# Patient Record
Sex: MALE | Employment: FULL TIME | URBAN - NONMETROPOLITAN AREA
[De-identification: names, ages, dates, MRNs, and addresses within clinical notes are randomized per-mention and may not be internally consistent; named-entity substitution may affect disease eponyms.]

---

## 2024-08-17 ENCOUNTER — HOSPITAL ENCOUNTER (INPATIENT)
Age: 49
LOS: 4 days | Discharge: HOME | DRG: 881 | End: 2024-08-21
Attending: PSYCHIATRY & NEUROLOGY | Admitting: PSYCHIATRY & NEUROLOGY
Payer: MEDICAID

## 2024-08-17 PROBLEM — F32.9 MAJOR DEPRESSIVE EPISODE: Status: ACTIVE | Noted: 2024-08-17

## 2024-08-17 PROCEDURE — 1240000000 HC EMOTIONAL WELLNESS R&B

## 2024-08-17 PROCEDURE — 6370000000 HC RX 637 (ALT 250 FOR IP): Performed by: PSYCHIATRY & NEUROLOGY

## 2024-08-17 RX ORDER — LORAZEPAM 2 MG/ML
2 INJECTION INTRAMUSCULAR EVERY 4 HOURS PRN
Status: DISCONTINUED | OUTPATIENT
Start: 2024-08-17 | End: 2024-08-21 | Stop reason: HOSPADM

## 2024-08-17 RX ORDER — MAGNESIUM HYDROXIDE/ALUMINUM HYDROXICE/SIMETHICONE 120; 1200; 1200 MG/30ML; MG/30ML; MG/30ML
30 SUSPENSION ORAL EVERY 6 HOURS PRN
Status: DISCONTINUED | OUTPATIENT
Start: 2024-08-17 | End: 2024-08-21 | Stop reason: HOSPADM

## 2024-08-17 RX ORDER — NICOTINE 21 MG/24HR
1 PATCH, TRANSDERMAL 24 HOURS TRANSDERMAL DAILY
Status: DISCONTINUED | OUTPATIENT
Start: 2024-08-17 | End: 2024-08-18

## 2024-08-17 RX ORDER — HALOPERIDOL 5 MG/ML
5 INJECTION INTRAMUSCULAR EVERY 4 HOURS PRN
Status: DISCONTINUED | OUTPATIENT
Start: 2024-08-17 | End: 2024-08-21 | Stop reason: HOSPADM

## 2024-08-17 RX ORDER — HALOPERIDOL 5 MG/1
5 TABLET ORAL EVERY 4 HOURS PRN
Status: DISCONTINUED | OUTPATIENT
Start: 2024-08-17 | End: 2024-08-21 | Stop reason: HOSPADM

## 2024-08-17 RX ORDER — IBUPROFEN 400 MG/1
400 TABLET ORAL EVERY 6 HOURS PRN
Status: DISCONTINUED | OUTPATIENT
Start: 2024-08-17 | End: 2024-08-21 | Stop reason: HOSPADM

## 2024-08-17 RX ORDER — POLYETHYLENE GLYCOL 3350 17 G/17G
17 POWDER, FOR SOLUTION ORAL DAILY PRN
Status: DISCONTINUED | OUTPATIENT
Start: 2024-08-17 | End: 2024-08-21 | Stop reason: HOSPADM

## 2024-08-17 RX ORDER — ACETAMINOPHEN 325 MG/1
650 TABLET ORAL EVERY 4 HOURS PRN
Status: DISCONTINUED | OUTPATIENT
Start: 2024-08-17 | End: 2024-08-21 | Stop reason: HOSPADM

## 2024-08-17 RX ORDER — TRAZODONE HYDROCHLORIDE 50 MG/1
50 TABLET ORAL NIGHTLY PRN
Status: DISCONTINUED | OUTPATIENT
Start: 2024-08-17 | End: 2024-08-18

## 2024-08-17 RX ORDER — HYDROXYZINE HYDROCHLORIDE 25 MG/1
50 TABLET, FILM COATED ORAL 3 TIMES DAILY PRN
Status: DISCONTINUED | OUTPATIENT
Start: 2024-08-17 | End: 2024-08-21 | Stop reason: HOSPADM

## 2024-08-17 RX ORDER — LORAZEPAM 2 MG/1
2 TABLET ORAL EVERY 4 HOURS PRN
Status: DISCONTINUED | OUTPATIENT
Start: 2024-08-17 | End: 2024-08-21 | Stop reason: HOSPADM

## 2024-08-17 RX ORDER — TRAZODONE HYDROCHLORIDE 50 MG/1
50 TABLET ORAL ONCE
Status: COMPLETED | OUTPATIENT
Start: 2024-08-18 | End: 2024-08-17

## 2024-08-17 RX ADMIN — TRAZODONE HYDROCHLORIDE 50 MG: 50 TABLET ORAL at 22:20

## 2024-08-17 RX ADMIN — TRAZODONE HYDROCHLORIDE 50 MG: 50 TABLET ORAL at 23:53

## 2024-08-17 RX ADMIN — HYDROXYZINE HYDROCHLORIDE 50 MG: 25 TABLET ORAL at 22:20

## 2024-08-17 ASSESSMENT — PATIENT HEALTH QUESTIONNAIRE - PHQ9
1. LITTLE INTEREST OR PLEASURE IN DOING THINGS: NOT AT ALL
SUM OF ALL RESPONSES TO PHQ QUESTIONS 1-9: 1
SUM OF ALL RESPONSES TO PHQ9 QUESTIONS 1 & 2: 1
2. FEELING DOWN, DEPRESSED OR HOPELESS: SEVERAL DAYS
SUM OF ALL RESPONSES TO PHQ QUESTIONS 1-9: 1

## 2024-08-17 ASSESSMENT — LIFESTYLE VARIABLES
HOW MANY STANDARD DRINKS CONTAINING ALCOHOL DO YOU HAVE ON A TYPICAL DAY: PATIENT DOES NOT DRINK
HOW OFTEN DO YOU HAVE A DRINK CONTAINING ALCOHOL: NEVER

## 2024-08-17 ASSESSMENT — SLEEP AND FATIGUE QUESTIONNAIRES
SLEEP PATTERN: DIFFICULTY FALLING ASLEEP;INSOMNIA;DISTURBED/INTERRUPTED SLEEP
DO YOU USE A SLEEP AID: NO
AVERAGE NUMBER OF SLEEP HOURS: 2
DO YOU HAVE DIFFICULTY SLEEPING: YES

## 2024-08-18 PROBLEM — R45.851 DEPRESSION WITH SUICIDAL IDEATION: Status: ACTIVE | Noted: 2024-08-18

## 2024-08-18 PROBLEM — F32.A DEPRESSION WITH SUICIDAL IDEATION: Status: ACTIVE | Noted: 2024-08-18

## 2024-08-18 PROCEDURE — APPSS60 APP SPLIT SHARED TIME 46-60 MINUTES: Performed by: NURSE PRACTITIONER

## 2024-08-18 PROCEDURE — 1240000000 HC EMOTIONAL WELLNESS R&B

## 2024-08-18 PROCEDURE — 6370000000 HC RX 637 (ALT 250 FOR IP): Performed by: PSYCHIATRY & NEUROLOGY

## 2024-08-18 PROCEDURE — 93005 ELECTROCARDIOGRAM TRACING: CPT | Performed by: NURSE PRACTITIONER

## 2024-08-18 PROCEDURE — 6370000000 HC RX 637 (ALT 250 FOR IP): Performed by: NURSE PRACTITIONER

## 2024-08-18 PROCEDURE — 99222 1ST HOSP IP/OBS MODERATE 55: CPT | Performed by: PSYCHIATRY & NEUROLOGY

## 2024-08-18 RX ORDER — TADALAFIL 10 MG/1
10 TABLET ORAL PRN
COMMUNITY

## 2024-08-18 RX ORDER — OMEPRAZOLE 20 MG/1
40 CAPSULE, DELAYED RELEASE ORAL DAILY
Status: ON HOLD | COMMUNITY
End: 2024-08-20

## 2024-08-18 RX ORDER — PANTOPRAZOLE SODIUM 40 MG/1
40 TABLET, DELAYED RELEASE ORAL
Status: DISCONTINUED | OUTPATIENT
Start: 2024-08-18 | End: 2024-08-21 | Stop reason: HOSPADM

## 2024-08-18 RX ORDER — TRAZODONE HYDROCHLORIDE 50 MG/1
100 TABLET ORAL NIGHTLY
Status: ON HOLD | COMMUNITY
End: 2024-08-20 | Stop reason: HOSPADM

## 2024-08-18 RX ORDER — ROSUVASTATIN CALCIUM 10 MG/1
10 TABLET, COATED ORAL DAILY
Status: ON HOLD | COMMUNITY
End: 2024-08-20

## 2024-08-18 RX ORDER — ROSUVASTATIN CALCIUM 10 MG/1
10 TABLET, COATED ORAL DAILY
Status: DISCONTINUED | OUTPATIENT
Start: 2024-08-18 | End: 2024-08-21 | Stop reason: HOSPADM

## 2024-08-18 RX ORDER — VENLAFAXINE HYDROCHLORIDE 37.5 MG/1
37.5 CAPSULE, EXTENDED RELEASE ORAL NIGHTLY
Status: DISCONTINUED | OUTPATIENT
Start: 2024-08-18 | End: 2024-08-19

## 2024-08-18 RX ORDER — TRAZODONE HYDROCHLORIDE 100 MG/1
100 TABLET ORAL NIGHTLY
Status: DISCONTINUED | OUTPATIENT
Start: 2024-08-18 | End: 2024-08-18

## 2024-08-18 RX ORDER — OMEGA-3-ACID ETHYL ESTERS 1 G/1
1 CAPSULE, LIQUID FILLED ORAL 2 TIMES DAILY
Status: ON HOLD | COMMUNITY
End: 2024-08-20 | Stop reason: HOSPADM

## 2024-08-18 RX ORDER — POLYETHYLENE GLYCOL 3350 17 G
2 POWDER IN PACKET (EA) ORAL
Status: DISCONTINUED | OUTPATIENT
Start: 2024-08-18 | End: 2024-08-21 | Stop reason: HOSPADM

## 2024-08-18 RX ORDER — OMEGA-3-ACID ETHYL ESTERS 1 G/1
1 CAPSULE, LIQUID FILLED ORAL 2 TIMES DAILY
Status: DISCONTINUED | OUTPATIENT
Start: 2024-08-18 | End: 2024-08-21 | Stop reason: HOSPADM

## 2024-08-18 RX ORDER — TRAZODONE HYDROCHLORIDE 100 MG/1
100 TABLET ORAL NIGHTLY PRN
Status: DISCONTINUED | OUTPATIENT
Start: 2024-08-18 | End: 2024-08-19

## 2024-08-18 RX ADMIN — HYDROXYZINE HYDROCHLORIDE 50 MG: 25 TABLET ORAL at 21:33

## 2024-08-18 RX ADMIN — NICOTINE POLACRILEX 2 MG: 2 LOZENGE ORAL at 15:20

## 2024-08-18 RX ADMIN — OMEGA-3-ACID ETHYL ESTERS CAPSULES 1 G: 1 CAPSULE, LIQUID FILLED ORAL at 21:34

## 2024-08-18 RX ADMIN — PANTOPRAZOLE SODIUM 40 MG: 40 TABLET, DELAYED RELEASE ORAL at 12:30

## 2024-08-18 RX ADMIN — VENLAFAXINE HYDROCHLORIDE 37.5 MG: 37.5 CAPSULE, EXTENDED RELEASE ORAL at 21:33

## 2024-08-18 RX ADMIN — NICOTINE POLACRILEX 2 MG: 2 LOZENGE ORAL at 17:34

## 2024-08-18 RX ADMIN — ROSUVASTATIN 10 MG: 10 TABLET, FILM COATED ORAL at 12:30

## 2024-08-18 RX ADMIN — NICOTINE POLACRILEX 2 MG: 2 LOZENGE ORAL at 20:10

## 2024-08-18 RX ADMIN — TRAZODONE HYDROCHLORIDE 100 MG: 100 TABLET ORAL at 21:34

## 2024-08-18 RX ADMIN — OMEGA-3-ACID ETHYL ESTERS CAPSULES 1 G: 1 CAPSULE, LIQUID FILLED ORAL at 12:30

## 2024-08-18 RX ADMIN — NICOTINE POLACRILEX 2 MG: 2 LOZENGE ORAL at 22:06

## 2024-08-18 NOTE — BH NOTE
Pt declined to attend 0900 Community Meeting and Goal group therapy session. Pt was provided maximum verbal encouragement to attend but pt remained resting in room. No daily goal was obtained.

## 2024-08-18 NOTE — H&P
report has been created using voice recognition software. It may contain minor errors which are inherent in voice recognition technology.**

## 2024-08-18 NOTE — GROUP NOTE
Group Therapy Note    Date: 8/18/2024    Group Start Time: 1400  Group End Time: 1445  Group Topic: Recreational    [facility] 7E    Cindy Tejeda CTRS        Group Therapy Note    Attendees: 5       Patient's Goal:  Pt to increase socialization with peers and knowledge of coping skills through [employer] game.    Notes:  Pt is engaging in group therapy conversation with peers and staff and verbalizes satisfaction with the staff here on the unit. Pt at times can be monopolizing of conversation but is easily redirected in conversation.     Status After Intervention:  Improved    Participation Level: Active Listener and Interactive    Participation Quality: Appropriate, Attentive, Sharing, and Supportive      Speech:  normal      Thought Process/Content: Logical      Affective Functioning: Congruent      Mood: euthymic      Level of consciousness:  Alert, Oriented x4, and Attentive      Response to Learning: Able to verbalize current knowledge/experience, Able to verbalize/acknowledge new learning, Able to retain information, Capable of insight, and Progressing to goal      Endings: None Reported    Modes of Intervention: Education, Support, Socialization, Exploration, Clarifying, Problem-solving, Activity, Limit-setting, and Reality-testing      Discipline Responsible: Psychoeducational Specialist      Signature:  KD Salazar

## 2024-08-18 NOTE — BH NOTE
INPATIENT RECREATIONAL THERAPY  ADULT BEHAVIORAL SERVICES  EVALUATION    REFERRING PHYSICIAN:  Dr. Mendoza   DIAGNOSIS:   Depression with Suicidal Ideations  PRECAUTIONS:  Standard Precautions   HISTORY OF PRESENT ILLNESS/INJURY: Pt is admitted to the unit under an EMC from [facility]. Pt reportedly was in a standoff with the plice for approximately three hours prior to arrival to the ED holding a gun to his head and threatening suicide. Pt reports he had conflict with his wife. He reports his wife left him two months ago and he has been feeling depressed since then. Pt reports he was at [facility] unit two months ago for depression and suicidal ideations. Pt reports he is unemployed but over the last several months has spent $70,000 traveling. He is irritable when questioned during assessments. Pt has been isolative to his room majority of this shift.   PMH:  Please see medical chart for prior medical history, allergies, and medication    HISTORY OF PSYCHIATRIC TREATMENT: Pt denies any outpatient psychiatric supports and denies any past suicide attempts. Pt reports a past admission to [facility] in [place] approximately 2 months ago.   YOB: 1975  GENDER:  Male   MARITAL STATUS:   but currently  from his wife  EMPLOYMENT STATUS:  Unemployed   LIVING SITUATION/SUPPORT:  Lives in [place] [facility] alone  EDUCATIONAL LEVEL:   MEDICATION/DRUG USE: Pt reports occasional alcohol use and occasional cannabis use. Pt did not identify frequency.     LEISURE INTERESTS:  Limit interests provided, outdoor activities, watching TV/movies, books  ACTIVITY PREFERENCE: Individual   ACTIVITY TYPES:  Indoor, Outdoor, Active, Passive  COGNITION: A&Ox4    COPING: Poor   ATTENTION: Fair   RELAXATION: Fair   SELF-ESTEEM: Poor   MOTIVATION:  Poor     SOCIAL SKILLS:  Fair-pt is isolative to his room at this time   FRUSTRATION TOLERANCE:  No documented hx of violence

## 2024-08-18 NOTE — PROCEDURES
PROCEDURE NOTE  Date: 8/18/2024   Name: Douglas Armstrong  YOB: 1975    Procedures  EKG completed    12

## 2024-08-19 PROBLEM — F32.9 MAJOR DEPRESSIVE EPISODE: Status: RESOLVED | Noted: 2024-08-17 | Resolved: 2024-08-19

## 2024-08-19 LAB
EKG ATRIAL RATE: 67 BPM
EKG P AXIS: 66 DEGREES
EKG P-R INTERVAL: 174 MS
EKG Q-T INTERVAL: 414 MS
EKG QRS DURATION: 92 MS
EKG QTC CALCULATION (BAZETT): 437 MS
EKG R AXIS: 46 DEGREES
EKG T AXIS: 28 DEGREES
EKG VENTRICULAR RATE: 67 BPM

## 2024-08-19 PROCEDURE — 6370000000 HC RX 637 (ALT 250 FOR IP): Performed by: PHYSICIAN ASSISTANT

## 2024-08-19 PROCEDURE — 6370000000 HC RX 637 (ALT 250 FOR IP): Performed by: PSYCHIATRY & NEUROLOGY

## 2024-08-19 PROCEDURE — 99232 SBSQ HOSP IP/OBS MODERATE 35: CPT | Performed by: PSYCHIATRY & NEUROLOGY

## 2024-08-19 PROCEDURE — 1240000000 HC EMOTIONAL WELLNESS R&B

## 2024-08-19 PROCEDURE — 93010 ELECTROCARDIOGRAM REPORT: CPT | Performed by: INTERNAL MEDICINE

## 2024-08-19 PROCEDURE — APPSS30 APP SPLIT SHARED TIME 16-30 MINUTES: Performed by: PHYSICIAN ASSISTANT

## 2024-08-19 PROCEDURE — 90833 PSYTX W PT W E/M 30 MIN: CPT | Performed by: PSYCHIATRY & NEUROLOGY

## 2024-08-19 RX ORDER — VENLAFAXINE HYDROCHLORIDE 75 MG/1
75 CAPSULE, EXTENDED RELEASE ORAL NIGHTLY
Status: DISCONTINUED | OUTPATIENT
Start: 2024-08-19 | End: 2024-08-21 | Stop reason: HOSPADM

## 2024-08-19 RX ADMIN — NICOTINE POLACRILEX 2 MG: 2 LOZENGE ORAL at 20:30

## 2024-08-19 RX ADMIN — OMEGA-3-ACID ETHYL ESTERS CAPSULES 1 G: 1 CAPSULE, LIQUID FILLED ORAL at 21:11

## 2024-08-19 RX ADMIN — HYDROXYZINE HYDROCHLORIDE 50 MG: 25 TABLET ORAL at 21:10

## 2024-08-19 RX ADMIN — PANTOPRAZOLE SODIUM 40 MG: 40 TABLET, DELAYED RELEASE ORAL at 07:49

## 2024-08-19 RX ADMIN — OMEGA-3-ACID ETHYL ESTERS CAPSULES 1 G: 1 CAPSULE, LIQUID FILLED ORAL at 07:49

## 2024-08-19 RX ADMIN — ROSUVASTATIN 10 MG: 10 TABLET, FILM COATED ORAL at 07:49

## 2024-08-19 RX ADMIN — TRAZODONE HYDROCHLORIDE 150 MG: 100 TABLET ORAL at 21:10

## 2024-08-19 RX ADMIN — VENLAFAXINE HYDROCHLORIDE 75 MG: 75 CAPSULE, EXTENDED RELEASE ORAL at 21:10

## 2024-08-19 NOTE — BH NOTE
Pt declined to attend 1100 Recreation Group therapy session. Pt was provided maximum verbal encouragement to attend group therapy session, pt remained in room

## 2024-08-19 NOTE — GROUP NOTE
Group Therapy Note    Date: 8/19/2024    Group Start Time: 1330  Group End Time: 1400  Group Topic: Healthy Living/Wellness    [facility] 7E    Paulette Mckeon LPN        Group Therapy Note    Attendees: 5       Notes:  did not attend    Discipline Responsible: Licensed Practical Nurse      Signature:  Paulette Mckeon LPN

## 2024-08-19 NOTE — BH NOTE
Pt was provided maximum verbal encouragement to attend 0900 Community Meeting and Goal group therapy session. Pt declined to attend group therapy and was resting in room during therapy session. Pt was provided 1:1 therapy session. However, pt refused a daily goal and verbally stated, \"I'm too tired right now for a goal. Come back after lunch\". No questions or concerns were expressed at this time.

## 2024-08-20 LAB — GLUCOSE BLD STRIP.AUTO-MCNC: 219 MG/DL (ref 70–108)

## 2024-08-20 PROCEDURE — 1240000000 HC EMOTIONAL WELLNESS R&B

## 2024-08-20 PROCEDURE — 82948 REAGENT STRIP/BLOOD GLUCOSE: CPT

## 2024-08-20 PROCEDURE — 90833 PSYTX W PT W E/M 30 MIN: CPT | Performed by: PSYCHIATRY & NEUROLOGY

## 2024-08-20 PROCEDURE — 6370000000 HC RX 637 (ALT 250 FOR IP): Performed by: PSYCHIATRY & NEUROLOGY

## 2024-08-20 PROCEDURE — 99232 SBSQ HOSP IP/OBS MODERATE 35: CPT | Performed by: PSYCHIATRY & NEUROLOGY

## 2024-08-20 PROCEDURE — 6370000000 HC RX 637 (ALT 250 FOR IP): Performed by: PHYSICIAN ASSISTANT

## 2024-08-20 PROCEDURE — APPSS30 APP SPLIT SHARED TIME 16-30 MINUTES: Performed by: PHYSICIAN ASSISTANT

## 2024-08-20 RX ORDER — LISINOPRIL 10 MG/1
10 TABLET ORAL DAILY
Status: DISCONTINUED | OUTPATIENT
Start: 2024-08-20 | End: 2024-08-21 | Stop reason: HOSPADM

## 2024-08-20 RX ORDER — HYDROCHLOROTHIAZIDE 12.5 MG/1
12.5 CAPSULE, GELATIN COATED ORAL EVERY MORNING
Status: ON HOLD | COMMUNITY
End: 2024-08-20

## 2024-08-20 RX ORDER — OMEPRAZOLE 40 MG/1
40 CAPSULE, DELAYED RELEASE ORAL DAILY
Qty: 30 CAPSULE | Refills: 0 | Status: SHIPPED | OUTPATIENT
Start: 2024-08-20

## 2024-08-20 RX ORDER — OMEGA-3-ACID ETHYL ESTERS 1 G/1
1 CAPSULE, LIQUID FILLED ORAL 2 TIMES DAILY
Qty: 60 CAPSULE | Refills: 0 | Status: SHIPPED | OUTPATIENT
Start: 2024-08-20

## 2024-08-20 RX ORDER — CANAGLIFLOZIN AND METFORMIN HYDROCHLORIDE 50; 1000 MG/1; MG/1
1 TABLET, FILM COATED ORAL 2 TIMES DAILY WITH MEALS
Qty: 60 TABLET | Refills: 0 | Status: SHIPPED | OUTPATIENT
Start: 2024-08-20 | End: 2024-08-20

## 2024-08-20 RX ORDER — LISINOPRIL 10 MG/1
10 TABLET ORAL DAILY
Status: ON HOLD | COMMUNITY
End: 2024-08-20

## 2024-08-20 RX ORDER — ASPIRIN 81 MG/1
81 TABLET ORAL DAILY
Status: DISCONTINUED | OUTPATIENT
Start: 2024-08-20 | End: 2024-08-21 | Stop reason: HOSPADM

## 2024-08-20 RX ORDER — HYDROCHLOROTHIAZIDE 12.5 MG/1
12.5 CAPSULE, GELATIN COATED ORAL EVERY MORNING
Qty: 30 CAPSULE | Refills: 0 | Status: SHIPPED | OUTPATIENT
Start: 2024-08-20

## 2024-08-20 RX ORDER — HYDROCHLOROTHIAZIDE 12.5 MG/1
12.5 CAPSULE, GELATIN COATED ORAL EVERY MORNING
Status: DISCONTINUED | OUTPATIENT
Start: 2024-08-20 | End: 2024-08-21 | Stop reason: HOSPADM

## 2024-08-20 RX ORDER — TRAZODONE HYDROCHLORIDE 150 MG/1
150 TABLET ORAL NIGHTLY PRN
Qty: 30 TABLET | Refills: 0 | Status: SHIPPED | OUTPATIENT
Start: 2024-08-20

## 2024-08-20 RX ORDER — ROSUVASTATIN CALCIUM 10 MG/1
10 TABLET, COATED ORAL DAILY
Qty: 30 TABLET | Refills: 0 | Status: SHIPPED | OUTPATIENT
Start: 2024-08-20

## 2024-08-20 RX ORDER — HYDROXYZINE 50 MG/1
50 TABLET, FILM COATED ORAL 3 TIMES DAILY PRN
Qty: 30 TABLET | Refills: 0 | Status: SHIPPED | OUTPATIENT
Start: 2024-08-20 | End: 2024-08-30

## 2024-08-20 RX ORDER — LIRAGLUTIDE 6 MG/ML
1.8 INJECTION SUBCUTANEOUS DAILY
COMMUNITY

## 2024-08-20 RX ORDER — LISINOPRIL 10 MG/1
10 TABLET ORAL DAILY
Qty: 30 TABLET | Refills: 0 | Status: SHIPPED | OUTPATIENT
Start: 2024-08-20

## 2024-08-20 RX ORDER — ASPIRIN 81 MG/1
81 TABLET ORAL DAILY
Qty: 30 TABLET | Refills: 0 | Status: SHIPPED | OUTPATIENT
Start: 2024-08-21

## 2024-08-20 RX ORDER — CANAGLIFLOZIN AND METFORMIN HYDROCHLORIDE 50; 1000 MG/1; MG/1
1 TABLET, FILM COATED ORAL 2 TIMES DAILY WITH MEALS
Status: ON HOLD | COMMUNITY
Start: 2024-05-13 | End: 2024-08-20

## 2024-08-20 RX ORDER — VENLAFAXINE HYDROCHLORIDE 75 MG/1
75 CAPSULE, EXTENDED RELEASE ORAL NIGHTLY
Qty: 30 CAPSULE | Refills: 0 | Status: SHIPPED | OUTPATIENT
Start: 2024-08-20

## 2024-08-20 RX ORDER — ASPIRIN 81 MG/1
1 TABLET ORAL
Status: ON HOLD | COMMUNITY
End: 2024-08-20 | Stop reason: HOSPADM

## 2024-08-20 RX ORDER — CANAGLIFLOZIN AND METFORMIN HYDROCHLORIDE 50; 1000 MG/1; MG/1
1 TABLET, FILM COATED ORAL 2 TIMES DAILY WITH MEALS
Qty: 60 TABLET | Refills: 0 | Status: SHIPPED | OUTPATIENT
Start: 2024-08-20

## 2024-08-20 RX ADMIN — OMEGA-3-ACID ETHYL ESTERS CAPSULES 1 G: 1 CAPSULE, LIQUID FILLED ORAL at 09:43

## 2024-08-20 RX ADMIN — LISINOPRIL 10 MG: 10 TABLET ORAL at 09:43

## 2024-08-20 RX ADMIN — HYDROCHLOROTHIAZIDE 12.5 MG: 12.5 CAPSULE ORAL at 09:42

## 2024-08-20 RX ADMIN — TRAZODONE HYDROCHLORIDE 150 MG: 100 TABLET ORAL at 21:21

## 2024-08-20 RX ADMIN — PANTOPRAZOLE SODIUM 40 MG: 40 TABLET, DELAYED RELEASE ORAL at 09:43

## 2024-08-20 RX ADMIN — METFORMIN HYDROCHLORIDE 1000 MG: 500 TABLET ORAL at 09:42

## 2024-08-20 RX ADMIN — METFORMIN HYDROCHLORIDE 1000 MG: 500 TABLET ORAL at 17:04

## 2024-08-20 RX ADMIN — NICOTINE POLACRILEX 2 MG: 2 LOZENGE ORAL at 21:21

## 2024-08-20 RX ADMIN — NICOTINE POLACRILEX 2 MG: 2 LOZENGE ORAL at 20:04

## 2024-08-20 RX ADMIN — HYDROXYZINE HYDROCHLORIDE 50 MG: 25 TABLET ORAL at 09:43

## 2024-08-20 RX ADMIN — ROSUVASTATIN 10 MG: 10 TABLET, FILM COATED ORAL at 09:43

## 2024-08-20 RX ADMIN — HYDROXYZINE HYDROCHLORIDE 50 MG: 25 TABLET ORAL at 21:21

## 2024-08-20 RX ADMIN — NICOTINE POLACRILEX 2 MG: 2 LOZENGE ORAL at 17:54

## 2024-08-20 RX ADMIN — VENLAFAXINE HYDROCHLORIDE 75 MG: 75 CAPSULE, EXTENDED RELEASE ORAL at 21:21

## 2024-08-20 RX ADMIN — NICOTINE POLACRILEX 2 MG: 2 LOZENGE ORAL at 09:44

## 2024-08-20 RX ADMIN — ASPIRIN 81 MG: 81 TABLET, COATED ORAL at 09:43

## 2024-08-20 RX ADMIN — OMEGA-3-ACID ETHYL ESTERS CAPSULES 1 G: 1 CAPSULE, LIQUID FILLED ORAL at 21:21

## 2024-08-20 NOTE — TRANSITION OF CARE
MG tablet  traZODone 150 MG tablet  venlafaxine 75 MG extended release capsule       These medications were sent to [facility] - [place], [place] - [address] [facility] - P [phone] - F [phone]  [address] [facility] [place] [address]      Phone: [phone]   Invokamet  MG         Unresulted Labs (24h ago, onward)      None            To obtain results of studies pending at discharge, please contact Medical Records    Follow-up Information       Follow up With Specialties Details Why Contact Info    [facility]  Call  [address] [place] [address]    Phone: [phone]             Advanced Directive:   Does the patient have an appointed surrogate decision maker? No  Does the patient have a Medical Advance Directive? No  Does the patient have a Psychiatric Advance Directive? No  If the patient does not have a surrogate or Medical Advance Directive AND Psychiatric Advance Directive, the patient was offered information on these advance directives Other na    Patient Instructions: Please continue all medications until otherwise directed by physician.  Keep follow up appointment    Tobacco Cessation Discharge Plan:   Is the patient a tobacco user  and needs referral for tobacco cessation? No  Patient referred to the following for tobacco cessation with an appointment? No  Patient was offered medication to assist with tobacco cessation at discharge? No    Alcohol/Substance Abuse Discharge Plan:   Does the patient have a history of substance/alcohol abuse and requires a referral for treatment? No  Patient referred to the following for substance/alcohol abuse treatment with an appointment? No  Patient was offered medication to assist with substance/alcohol abuse cessation at discharge? No      Patient discharged to: Home; Transition record discussed with patient/caregiver and provided this record in hard copy or electronically

## 2024-08-20 NOTE — PATIENT CARE CONFERENCE
Behavioral Health   Day 3 Interdisciplinary Treatment Plan NOTE    Review Date & Time: 8/20/24 1508    Patient was in treatment team    Admission Type:   Admission Type: Involuntary    Reason for admission:  Reason for Admission: States impulsive thoughts to kill self, held gun to head for 3 hours with police present.  Estimated Length of Stay Update:  1-2 days  Estimated Discharge Date Update: 8/23/24       Addictive Behavior:Addictive Behavior  In the Past 3 Months, Have You Felt or Has Someone Told You That You Have a Problem With  : None  Medical Problems:  Past Medical History:   Diagnosis Date    Diabetes mellitus (HCC)     GERD (gastroesophageal reflux disease)     Psychiatric problem        Risk:  Fall Risk   Jagjit Scale Jagjit Scale Score: 21    Status EXAM:   Mental Status and Behavioral Exam  Normal: No  Level of Assistance: Independent/Self  Facial Expression: Flat  Affect: Blunt  Level of Consciousness: Alert  Frequency of Checks: 4 times per hour, close  Mood:Normal: No  Mood: Depressed, Anxious  Motor Activity:Normal: No  Motor Activity: Decreased  Eye Contact: Fair  Observed Behavior: Cooperative  Sexual Misconduct History: Current - no  Preception: [place]wood to person, [place] to time, [place] to place, [place] to situation  Attention:Normal: Yes  Thought Processes: Unremarkable  Thought Content:Normal: Yes  Depression Symptoms: Change in energy level  Anxiety Symptoms: Generalized  Naina Symptoms: No problems reported or observed.  Hallucinations: None  Delusions: No  Memory:Normal: Yes  Insight and Judgment: No  Insight and Judgment: Poor insight, Poor judgment    Daily Assessment Last Entry:   Daily Sleep (WDL): Within Defined Limits            Daily Nutrition (WDL): Within Defined Limits  Level of Assistance: Independent/Self    Patient Monitoring:  Frequency of Checks: 4 times per hour, close    Psychiatric Symptoms:   Depression Symptoms  Depression Symptoms: Change in energy level  Anxiety

## 2024-08-20 NOTE — DISCHARGE INSTRUCTIONS
[facility] Hotline:  [phone]    Crisis phone numbers:  [facility], and [facility] [phone].  [facility], and [facility] [phone]  [facility] [phone].  [facility] [phone].  [facility] [phone].  [facility] [place] [phone].    [facility]  [address], [place] [address]  [phone]    [facility] [place] [facility]  [address]  [address], [place] [address]  [place], [place] [id number] [phone]    [facility]  [address] [address] [address] [place] [address]  [phone]    [facility]  [address] [place] [address]  [phone]    [facility]  [address] [place] [address]  [phone]    [facility]  [address], [place] [id number] [phone]    [facility]  [address] [place] [address]  [phone]    [facility]  [address] [place], [place] [address] [phone]    [facility]  [address] [address]  [phone]    [address] [address]  [phone]    [facility]  [address] [place], [place] [address] [phone]    [facility]  [address] [place] [address]  [phone]    [facility]  [address] [place], [place] [address] [phone]

## 2024-08-20 NOTE — DISCHARGE INSTR - DIET

## 2024-08-21 VITALS
DIASTOLIC BLOOD PRESSURE: 82 MMHG | OXYGEN SATURATION: 99 % | WEIGHT: 175 LBS | HEART RATE: 108 BPM | SYSTOLIC BLOOD PRESSURE: 114 MMHG | RESPIRATION RATE: 16 BRPM | TEMPERATURE: 98.4 F | BODY MASS INDEX: 28.12 KG/M2 | HEIGHT: 66 IN

## 2024-08-21 LAB — GLUCOSE BLD STRIP.AUTO-MCNC: 198 MG/DL (ref 70–108)

## 2024-08-21 PROCEDURE — 82948 REAGENT STRIP/BLOOD GLUCOSE: CPT

## 2024-08-21 PROCEDURE — 6370000000 HC RX 637 (ALT 250 FOR IP): Performed by: PSYCHIATRY & NEUROLOGY

## 2024-08-21 PROCEDURE — 6370000000 HC RX 637 (ALT 250 FOR IP): Performed by: PHYSICIAN ASSISTANT

## 2024-08-21 PROCEDURE — 5130000000 HC BRIDGE APPOINTMENT

## 2024-08-21 PROCEDURE — 99239 HOSP IP/OBS DSCHRG MGMT >30: CPT | Performed by: PSYCHIATRY & NEUROLOGY

## 2024-08-21 RX ADMIN — HYDROXYZINE HYDROCHLORIDE 50 MG: 25 TABLET ORAL at 07:05

## 2024-08-21 RX ADMIN — NICOTINE POLACRILEX 2 MG: 2 LOZENGE ORAL at 06:07

## 2024-08-21 RX ADMIN — METFORMIN HYDROCHLORIDE 1000 MG: 500 TABLET ORAL at 07:04

## 2024-08-21 RX ADMIN — PANTOPRAZOLE SODIUM 40 MG: 40 TABLET, DELAYED RELEASE ORAL at 07:05

## 2024-08-21 RX ADMIN — LISINOPRIL 10 MG: 10 TABLET ORAL at 07:05

## 2024-08-21 RX ADMIN — HYDROCHLOROTHIAZIDE 12.5 MG: 12.5 CAPSULE ORAL at 07:04

## 2024-08-21 RX ADMIN — OMEGA-3-ACID ETHYL ESTERS CAPSULES 1 G: 1 CAPSULE, LIQUID FILLED ORAL at 07:04

## 2024-08-21 RX ADMIN — ROSUVASTATIN 10 MG: 10 TABLET, FILM COATED ORAL at 07:05

## 2024-08-21 RX ADMIN — ASPIRIN 81 MG: 81 TABLET, COATED ORAL at 07:04

## 2024-08-21 ASSESSMENT — PAIN SCALES - GENERAL: PAINLEVEL_OUTOF10: 0
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
## 2024-08-21 NOTE — PLAN OF CARE
Problem: Chronic Conditions and Co-morbidities  Goal: Patient's chronic conditions and co-morbidity symptoms are monitored and maintained or improved  8/19/2024 1006 by Paulette Mckeon LPN  Outcome: Progressing  Note: Patients conditions and symptoms are being monitored and maintained  8/19/2024 0530 by Angeles Johnston RN  Outcome: Progressing     Problem: Risk for Elopement  Goal: Patient will not exit the unit/facility without proper excort  8/19/2024 1006 by Paulette Mckeon LPN  Outcome: Progressing  Note: Patient has not attempted to exit the facility at this time, will continue to monitor  8/19/2024 0530 by Angeles Johnston RN  Outcome: Progressing  Flowsheets (Taken 8/18/2024 1930)  Nursing Interventions for Elopement Risk:   Communicate/escalate to charge nurse the risk of elopement   Reduce environmental triggers   Make sure patient has all necessary personal care items  Note: No exit seeking this shift.      Problem: Self Harm/Suicidality  Goal: Will have no self-injury during hospital stay  Description: INTERVENTIONS:  1.  Ensure constant observer at bedside with Q15M safety checks  2.  Maintain a safe environment  3.  Secure patient belongings  4.  Ensure family/visitors adhere to safety recommendations  5.  Ensure safety tray has been added to patient's diet order  6.  Every shift and PRN: Re-assess suicidal risk via Frequent Screener    8/19/2024 1006 by Paulette Mckeon LPN  Outcome: Progressing  Note: No self injury during hospital stay at this time, will continue to monitor  8/19/2024 0530 by Angeles Johnston RN  Outcome: Progressing  Flowsheets (Taken 8/18/2024 1930)  Will have no self-injury during hospital stay: Maintain a safe environment  Note: Patient maintained in a safe and secure environment AEB no injuries to self or others. Patient denies suicidal ideations at this time.       Problem: Depression  Goal: Will be euthymic at discharge  Description: INTERVENTIONS:  1. 
  Problem: Chronic Conditions and Co-morbidities  Goal: Patient's chronic conditions and co-morbidity symptoms are monitored and maintained or improved  8/19/2024 2333 by Royal Zelaya RN  Outcome: Progressing  Flowsheets  Taken 8/19/2024 2333  Care Plan - Patient's Chronic Conditions and Co-Morbidity Symptoms are Monitored and Maintained or Improved:   Monitor and assess patient's chronic conditions and comorbid symptoms for stability, deterioration, or improvement   Collaborate with multidisciplinary team to address chronic and comorbid conditions and prevent exacerbation or deterioration  Taken 8/19/2024 2316  Care Plan - Patient's Chronic Conditions and Co-Morbidity Symptoms are Monitored and Maintained or Improved:   Monitor and assess patient's chronic conditions and comorbid symptoms for stability, deterioration, or improvement   Collaborate with multidisciplinary team to address chronic and comorbid conditions and prevent exacerbation or deterioration  8/19/2024 1006 by Paulette Mckeon LPN  Outcome: Progressing  Note: Patients conditions and symptoms are being monitored and maintained     Problem: Risk for Elopement  Goal: Patient will not exit the unit/facility without proper excort  8/19/2024 2333 by Royal Zelaya RN  Outcome: Progressing  Flowsheets (Taken 8/19/2024 2316)  Nursing Interventions for Elopement Risk:   Communicate/escalate to charge nurse the risk of elopement   Reduce environmental triggers   Make sure patient has all necessary personal care items  Note: Patient will make no attempt to leave unit unescorted.   8/19/2024 1006 by Paulette Mckeon LPN  Outcome: Progressing  Note: Patient has not attempted to exit the facility at this time, will continue to monitor     Problem: Self Harm/Suicidality  Goal: Will have no self-injury during hospital stay  Description: INTERVENTIONS:  1.  Ensure constant observer at bedside with Q15M safety checks  2.  Maintain a safe 
  Problem: Chronic Conditions and Co-morbidities  Goal: Patient's chronic conditions and co-morbidity symptoms are monitored and maintained or improved  8/19/2024 2346 by Royal Zelaya RN  Outcome: Progressing  Flowsheets (Taken 8/19/2024 2346)  Care Plan - Patient's Chronic Conditions and Co-Morbidity Symptoms are Monitored and Maintained or Improved:   Collaborate with multidisciplinary team to address chronic and comorbid conditions and prevent exacerbation or deterioration   Update acute care plan with appropriate goals if chronic or comorbid symptoms are exacerbated and prevent overall improvement and discharge   Monitor and assess patient's chronic conditions and comorbid symptoms for stability, deterioration, or improvement  8/19/2024 2333 by Royal Zelaya RN  Outcome: Progressing  Flowsheets  Taken 8/19/2024 2333  Care Plan - Patient's Chronic Conditions and Co-Morbidity Symptoms are Monitored and Maintained or Improved:   Monitor and assess patient's chronic conditions and comorbid symptoms for stability, deterioration, or improvement   Collaborate with multidisciplinary team to address chronic and comorbid conditions and prevent exacerbation or deterioration  Taken 8/19/2024 2316  Care Plan - Patient's Chronic Conditions and Co-Morbidity Symptoms are Monitored and Maintained or Improved:   Monitor and assess patient's chronic conditions and comorbid symptoms for stability, deterioration, or improvement   Collaborate with multidisciplinary team to address chronic and comorbid conditions and prevent exacerbation or deterioration  8/19/2024 1006 by Paulette Mckeon LPN  Outcome: Progressing  Note: Patients conditions and symptoms are being monitored and maintained     Problem: Risk for Elopement  Goal: Patient will not exit the unit/facility without proper excort  8/19/2024 2346 by Royal Zelaya RN  Outcome: Progressing  Note: Patient will make no attempt to leave unit 
  Problem: Chronic Conditions and Co-morbidities  Goal: Patient's chronic conditions and co-morbidity symptoms are monitored and maintained or improved  8/20/2024 1244 by Nusrat Matta RN  Outcome: Progressing  Flowsheets (Taken 8/20/2024 0930)  Care Plan - Patient's Chronic Conditions and Co-Morbidity Symptoms are Monitored and Maintained or Improved: Monitor and assess patient's chronic conditions and comorbid symptoms for stability, deterioration, or improvement     Problem: Risk for Elopement  Goal: Patient will not exit the unit/facility without proper excort  8/20/2024 1244 by Nusrat Matta RN  Outcome: Progressing  Flowsheets (Taken 8/20/2024 0930)  Nursing Interventions for Elopement Risk: Assist with personal care needs such as toileting, eating, dressing, as needed to reduce the risk of wandering  Note: Patient has not been observed to be checking the exit doors or demonstrating behaviors of attempting to leave the unit.       Problem: Self Harm/Suicidality  Goal: Will have no self-injury during hospital stay  Description: INTERVENTIONS:  1.  Ensure constant observer at bedside with Q15M safety checks  2.  Maintain a safe environment  3.  Secure patient belongings  4.  Ensure family/visitors adhere to safety recommendations  5.  Ensure safety tray has been added to patient's diet order  6.  Every shift and PRN: Re-assess suicidal risk via Frequent Screener    8/20/2024 1244 by Nusrat Matta RN  Outcome: Progressing  Flowsheets (Taken 8/20/2024 0930)  Will have no self-injury during hospital stay:   Maintain a safe environment   Every shift and PRN: Re-assess suicidal risk via Frequent Screener  Note: No self harm behaviors were observed or reported so far this shift. Remains on every 15 minutes precautions for safety.  Patient denies suicidal ideations at present time       Problem: Depression  Goal: Will be euthymic at discharge  Description: INTERVENTIONS:  1. Administer medication as 
  Problem: Chronic Conditions and Co-morbidities  Goal: Patient's chronic conditions and co-morbidity symptoms are monitored and maintained or improved  8/21/2024 0241 by Angeles Johnston RN  Outcome: Progressing  8/20/2024 1244 by Nusrat Matta RN  Outcome: Progressing  Flowsheets (Taken 8/20/2024 0930)  Care Plan - Patient's Chronic Conditions and Co-Morbidity Symptoms are Monitored and Maintained or Improved: Monitor and assess patient's chronic conditions and comorbid symptoms for stability, deterioration, or improvement  8/20/2024 1243 by Nusrat Matta RN  Outcome: Progressing  Flowsheets (Taken 8/20/2024 0930)  Care Plan - Patient's Chronic Conditions and Co-Morbidity Symptoms are Monitored and Maintained or Improved: Monitor and assess patient's chronic conditions and comorbid symptoms for stability, deterioration, or improvement     Problem: Risk for Elopement  Goal: Patient will not exit the unit/facility without proper excort  8/21/2024 0241 by Angeles Johnston RN  Outcome: Progressing  Note: No exit seeking this shift.   8/20/2024 1244 by Nusrat Matta RN  Outcome: Progressing  Flowsheets (Taken 8/20/2024 0930)  Nursing Interventions for Elopement Risk: Assist with personal care needs such as toileting, eating, dressing, as needed to reduce the risk of wandering  Note: Patient has not been observed to be checking the exit doors or demonstrating behaviors of attempting to leave the unit.    8/20/2024 1243 by Nusrat Matta RN  Outcome: Progressing  Flowsheets (Taken 8/20/2024 0930)  Nursing Interventions for Elopement Risk: Assist with personal care needs such as toileting, eating, dressing, as needed to reduce the risk of wandering     Problem: Self Harm/Suicidality  Goal: Will have no self-injury during hospital stay  Description: INTERVENTIONS:  1.  Ensure constant observer at bedside with Q15M safety checks  2.  Maintain a safe environment  3.  Secure patient belongings  4.  
  Problem: Discharge Planning  Goal: Discharge to home or other facility with appropriate resources  8/19/2024 1006 by Paulette Mckeon LPN  Outcome: Not Progressing  Note: Discharge planning is yet to be scheduled  8/19/2024 0530 by Angeles Johnston, RN  Outcome: Progressing  Note: Discharge planner working with patient to achieve optimal discharge plan, specific to the needs of this patient.        Problem: Coping  Goal: Pt/Family able to verbalize concerns and demonstrate effective coping strategies  Description: INTERVENTIONS:  1. Assist patient/family to identify coping skills, available support systems and cultural and spiritual values  2. Provide emotional support, including active listening and acknowledgement of concerns of patient and caregivers  3. Reduce environmental stimuli, as able  4. Instruct patient/family in relaxation techniques, as appropriate  5. Assess for spiritual pain/suffering and initiate Spiritual Care, Psychosocial Clinical Specialist consults as needed  8/19/2024 1620 by Rola Edwards, OT  Outcome: Not Progressing  Flowsheets (Taken 8/19/2024 1620)  Patient/family able to verbalize anxieties, fears, and concerns, and demonstrate effective coping:   Assist patient/family to identify coping skills, available support systems and cultural and spiritual values   Provide emotional support, including active listening and acknowledgement of concerns of patient and caregivers  Note: Pt has attended 0/3 group therapy sessions offered thus far today. Pt has been given maximum verbal encouragement to attend group therapy sessions, pt has been isolative to his room the majority of the day. Intermittently, pt is seen out on the unit interacting with others. Plan to continue to monitor progress and encourage participation in group therapy programming.    8/19/2024 1006 by Paulette Mckeon LPN  Outcome: Progressing  Note: Patient did verbalize some concerns and is given encouragement to attend 
attending group to obtain maximum benefit from treatment. Pt has not attended any groups but was encouraged. Handout provided on important \"slogans' in recovery. Pt identified \"I need people\" a slogan that he could use. Support provided     
management agreement, if implemented  Description: INTERVENTIONS:  1. Assess patient/family's coping skills and  non-compliant behavior (including use of illegal substances)  2. Notify security of behavior or suspected illegal substances which indicate the need for search of the family and/or belongings  3. Encourage verbalization of thoughts and concerns in a socially appropriate manner  4. Utilize positive, consistent limit setting strategies supporting safety of patient, staff and others  5. Encourage participation in the decision making process about the behavioral management agreement  6. If a visitor's behavior poses a threat to safety call refer to organization policy.  7. Initiate consult with , Psychosocial CNS, Spiritual Care as appropriate  Outcome: Progressing  Flowsheets (Taken 8/18/2024 1930)  Patient/family maintains appropriate behavior and adheres to behavioral management agreement, if implemented: Assess patient/family’s coping skills and  non-compliant behavior (including use of illegal substances)  Note: Patient has moments of yelling or raising voice to get his point across but does seem to calm down and talk normal and kind. Is easily redirected.      Problem: Anxiety  Goal: Will report anxiety at manageable levels  Description: INTERVENTIONS:  1. Administer medication as ordered  2. Teach and rehearse alternative coping skills  3. Provide emotional support with 1:1 interaction with staff  Outcome: Progressing  Flowsheets (Taken 8/18/2024 1930)  Will report anxiety at manageable levels: Provide emotional support with 1:1 interaction with staff  Note: Patient reports \"a little\" anxiety and was given prn medication which has caused improvement, patient has been sleeping well in bed.      Problem: Sleep Disturbance  Goal: Will exhibit normal sleeping pattern  Description: INTERVENTIONS:  1. Administer medication as ordered  2. Decrease environmental stimuli, including noise, as 
patient/family to identify coping skills, available support systems and cultural and spiritual values  Note: Education provided on importance of attending group to obtain maximum benefit from treatment. Pt has not attended any groups but was encouraged. Handout provided on important \"slogans' in recovery. Pt identified \"I need people\" a slogan that he could use. Support provided   Care plan reviewed with patient.  Patient does verbalize understanding of the plan of care and did  contribute to goal setting.\"Get some rest\"